# Patient Record
Sex: FEMALE
[De-identification: names, ages, dates, MRNs, and addresses within clinical notes are randomized per-mention and may not be internally consistent; named-entity substitution may affect disease eponyms.]

---

## 2017-02-21 ENCOUNTER — TRANSCRIPTION ENCOUNTER (OUTPATIENT)
Age: 22
End: 2017-02-21

## 2017-05-23 ENCOUNTER — APPOINTMENT (OUTPATIENT)
Dept: INTERNAL MEDICINE | Facility: CLINIC | Age: 22
End: 2017-05-23

## 2017-07-20 ENCOUNTER — EMERGENCY (EMERGENCY)
Facility: HOSPITAL | Age: 22
LOS: 1 days | Discharge: HOME | End: 2017-07-20
Attending: EMERGENCY MEDICINE
Payer: COMMERCIAL

## 2017-07-20 VITALS
OXYGEN SATURATION: 100 % | WEIGHT: 102.07 LBS | TEMPERATURE: 98 F | HEART RATE: 99 BPM | HEIGHT: 58 IN | SYSTOLIC BLOOD PRESSURE: 122 MMHG | DIASTOLIC BLOOD PRESSURE: 70 MMHG | RESPIRATION RATE: 18 BRPM

## 2017-07-20 PROCEDURE — 99283 EMERGENCY DEPT VISIT LOW MDM: CPT

## 2017-07-20 PROCEDURE — 99282 EMERGENCY DEPT VISIT SF MDM: CPT
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
## 2017-10-05 NOTE — ED STATDOCS - CARDIAC, MLM
skin suture normal rate, regular rhythm, and no murmur. normal rate, regular rhythm, and no murmur. No chest wall ttp

## 2018-04-17 ENCOUNTER — EMERGENCY (EMERGENCY)
Facility: HOSPITAL | Age: 23
LOS: 1 days | Discharge: HOME | End: 2018-04-17
Attending: EMERGENCY MEDICINE
Payer: COMMERCIAL

## 2018-04-17 VITALS
SYSTOLIC BLOOD PRESSURE: 118 MMHG | OXYGEN SATURATION: 100 % | HEART RATE: 83 BPM | DIASTOLIC BLOOD PRESSURE: 75 MMHG | TEMPERATURE: 98 F | RESPIRATION RATE: 18 BRPM

## 2018-04-17 VITALS — WEIGHT: 100.09 LBS | HEIGHT: 58 IN

## 2018-04-17 LAB
ALBUMIN SERPL ELPH-MCNC: 4.7 G/DL — SIGNIFICANT CHANGE UP (ref 3.3–5.2)
ALP SERPL-CCNC: 52 U/L — SIGNIFICANT CHANGE UP (ref 40–120)
ALT FLD-CCNC: 13 U/L — SIGNIFICANT CHANGE UP
ANION GAP SERPL CALC-SCNC: 15 MMOL/L — SIGNIFICANT CHANGE UP (ref 5–17)
APPEARANCE UR: CLEAR — SIGNIFICANT CHANGE UP
AST SERPL-CCNC: 22 U/L — SIGNIFICANT CHANGE UP
BACTERIA # UR AUTO: ABNORMAL
BASOPHILS # BLD AUTO: 0 K/UL — SIGNIFICANT CHANGE UP (ref 0–0.2)
BASOPHILS NFR BLD AUTO: 0.5 % — SIGNIFICANT CHANGE UP (ref 0–2)
BILIRUB SERPL-MCNC: <0.2 MG/DL — LOW (ref 0.4–2)
BILIRUB UR-MCNC: NEGATIVE — SIGNIFICANT CHANGE UP
BUN SERPL-MCNC: 6 MG/DL — LOW (ref 8–20)
CALCIUM SERPL-MCNC: 9.1 MG/DL — SIGNIFICANT CHANGE UP (ref 8.6–10.2)
CHLORIDE SERPL-SCNC: 98 MMOL/L — SIGNIFICANT CHANGE UP (ref 98–107)
CO2 SERPL-SCNC: 24 MMOL/L — SIGNIFICANT CHANGE UP (ref 22–29)
COLOR SPEC: YELLOW — SIGNIFICANT CHANGE UP
CREAT SERPL-MCNC: 0.56 MG/DL — SIGNIFICANT CHANGE UP (ref 0.5–1.3)
DIFF PNL FLD: ABNORMAL
EOSINOPHIL # BLD AUTO: 0 K/UL — SIGNIFICANT CHANGE UP (ref 0–0.5)
EOSINOPHIL NFR BLD AUTO: 0.5 % — SIGNIFICANT CHANGE UP (ref 0–6)
EPI CELLS # UR: SIGNIFICANT CHANGE UP
GLUCOSE SERPL-MCNC: 91 MG/DL — SIGNIFICANT CHANGE UP (ref 70–115)
GLUCOSE UR QL: NEGATIVE MG/DL — SIGNIFICANT CHANGE UP
HCG SERPL-ACNC: <5 MIU/ML — SIGNIFICANT CHANGE UP
HCG UR QL: NEGATIVE — SIGNIFICANT CHANGE UP
HCT VFR BLD CALC: 37.8 % — SIGNIFICANT CHANGE UP (ref 37–47)
HGB BLD-MCNC: 12.2 G/DL — SIGNIFICANT CHANGE UP (ref 12–16)
KETONES UR-MCNC: ABNORMAL
LEUKOCYTE ESTERASE UR-ACNC: NEGATIVE — SIGNIFICANT CHANGE UP
LIDOCAIN IGE QN: 32 U/L — SIGNIFICANT CHANGE UP (ref 22–51)
LYMPHOCYTES # BLD AUTO: 1.6 K/UL — SIGNIFICANT CHANGE UP (ref 1–4.8)
LYMPHOCYTES # BLD AUTO: 24.8 % — SIGNIFICANT CHANGE UP (ref 20–55)
MCHC RBC-ENTMCNC: 29.5 PG — SIGNIFICANT CHANGE UP (ref 27–31)
MCHC RBC-ENTMCNC: 32.3 G/DL — SIGNIFICANT CHANGE UP (ref 32–36)
MCV RBC AUTO: 91.3 FL — SIGNIFICANT CHANGE UP (ref 81–99)
MONOCYTES # BLD AUTO: 0.4 K/UL — SIGNIFICANT CHANGE UP (ref 0–0.8)
MONOCYTES NFR BLD AUTO: 6.3 % — SIGNIFICANT CHANGE UP (ref 3–10)
NEUTROPHILS # BLD AUTO: 4.4 K/UL — SIGNIFICANT CHANGE UP (ref 1.8–8)
NEUTROPHILS NFR BLD AUTO: 67.9 % — SIGNIFICANT CHANGE UP (ref 37–73)
NITRITE UR-MCNC: NEGATIVE — SIGNIFICANT CHANGE UP
PH UR: 6.5 — SIGNIFICANT CHANGE UP (ref 5–8)
PLATELET # BLD AUTO: 377 K/UL — SIGNIFICANT CHANGE UP (ref 150–400)
POTASSIUM SERPL-MCNC: 4 MMOL/L — SIGNIFICANT CHANGE UP (ref 3.5–5.3)
POTASSIUM SERPL-SCNC: 4 MMOL/L — SIGNIFICANT CHANGE UP (ref 3.5–5.3)
PROT SERPL-MCNC: 7.7 G/DL — SIGNIFICANT CHANGE UP (ref 6.6–8.7)
PROT UR-MCNC: NEGATIVE MG/DL — SIGNIFICANT CHANGE UP
RBC # BLD: 4.14 M/UL — LOW (ref 4.4–5.2)
RBC # FLD: 12.6 % — SIGNIFICANT CHANGE UP (ref 11–15.6)
RBC CASTS # UR COMP ASSIST: ABNORMAL /HPF (ref 0–4)
SODIUM SERPL-SCNC: 137 MMOL/L — SIGNIFICANT CHANGE UP (ref 135–145)
SP GR SPEC: 1.01 — SIGNIFICANT CHANGE UP (ref 1.01–1.02)
UROBILINOGEN FLD QL: NEGATIVE MG/DL — SIGNIFICANT CHANGE UP
WBC # BLD: 6.5 K/UL — SIGNIFICANT CHANGE UP (ref 4.8–10.8)
WBC # FLD AUTO: 6.5 K/UL — SIGNIFICANT CHANGE UP (ref 4.8–10.8)
WBC UR QL: SIGNIFICANT CHANGE UP

## 2018-04-17 PROCEDURE — 81001 URINALYSIS AUTO W/SCOPE: CPT

## 2018-04-17 PROCEDURE — 87206 SMEAR FLUORESCENT/ACID STAI: CPT

## 2018-04-17 PROCEDURE — 76856 US EXAM PELVIC COMPLETE: CPT

## 2018-04-17 PROCEDURE — 74177 CT ABD & PELVIS W/CONTRAST: CPT

## 2018-04-17 PROCEDURE — 81025 URINE PREGNANCY TEST: CPT

## 2018-04-17 PROCEDURE — 36415 COLL VENOUS BLD VENIPUNCTURE: CPT

## 2018-04-17 PROCEDURE — 87015 SPECIMEN INFECT AGNT CONCNTJ: CPT

## 2018-04-17 PROCEDURE — 99284 EMERGENCY DEPT VISIT MOD MDM: CPT

## 2018-04-17 PROCEDURE — 76830 TRANSVAGINAL US NON-OB: CPT

## 2018-04-17 PROCEDURE — 87070 CULTURE OTHR SPECIMN AEROBIC: CPT

## 2018-04-17 PROCEDURE — 85027 COMPLETE CBC AUTOMATED: CPT

## 2018-04-17 PROCEDURE — 83690 ASSAY OF LIPASE: CPT

## 2018-04-17 PROCEDURE — 76856 US EXAM PELVIC COMPLETE: CPT | Mod: 26

## 2018-04-17 PROCEDURE — 84702 CHORIONIC GONADOTROPIN TEST: CPT

## 2018-04-17 PROCEDURE — 74177 CT ABD & PELVIS W/CONTRAST: CPT | Mod: 26

## 2018-04-17 PROCEDURE — 76830 TRANSVAGINAL US NON-OB: CPT | Mod: 26

## 2018-04-17 PROCEDURE — 87116 MYCOBACTERIA CULTURE: CPT

## 2018-04-17 PROCEDURE — 80053 COMPREHEN METABOLIC PANEL: CPT

## 2018-04-17 RX ORDER — CEFTRIAXONE 500 MG/1
250 INJECTION, POWDER, FOR SOLUTION INTRAMUSCULAR; INTRAVENOUS ONCE
Qty: 0 | Refills: 0 | Status: COMPLETED | OUTPATIENT
Start: 2018-04-17 | End: 2018-04-17

## 2018-04-17 RX ORDER — AZITHROMYCIN 500 MG/1
1000 TABLET, FILM COATED ORAL ONCE
Qty: 0 | Refills: 0 | Status: COMPLETED | OUTPATIENT
Start: 2018-04-17 | End: 2018-04-17

## 2018-04-17 NOTE — ED PROVIDER NOTE - OBJECTIVE STATEMENT
23yoF with h/o asthma, presenting with generalized abdominal pain, generalized malaise, b/l back pain, chills.  Pt was at PMD Dr. Butterfield's office on Friday for dysuria - was started on Bactrim DS and feels dysuria has resolved but still not feeling well.  She also noted some increased vaginal discharge and reports recent unprotected intercourse w/ her partner.  LMP 3/27.  She reports nausea, but denies vomiting/diarrhea.

## 2018-04-17 NOTE — ED PROVIDER NOTE - PROGRESS NOTE DETAILS
I discussed results w/ patient and recommended treatment for PID until she is able to receive results from GC/ Chlamydia.  Pt states that now she is not sure if discharge is actually worse than usual and she wants to defer treatment until she gets results of the test. I instructed her to f/u with her PCP and with gynecology. Patient feeling well for discharge. She accepted 1 x treatment for STD treatment.  Repeat temp 98.8F

## 2018-04-17 NOTE — ED ADULT NURSE NOTE - CHPI ED SYMPTOMS NEG
no hematuria/no fever/no vomiting/no burning urination/no dysuria/no chills/no diarrhea/no nausea/no abdominal distension/no blood in stool

## 2018-04-17 NOTE — ED ADULT NURSE NOTE - OBJECTIVE STATEMENT
Patient states that she was seen in the office and treated for a UTI 5 days ago. Patient states that she has been on Bactrim for 5 days but feels like she is getting worse. Patient c.o lower abdominal pain and bloating. Patient denies any burning with urination or blood in her urine.

## 2018-04-17 NOTE — ED PROVIDER NOTE - MEDICAL DECISION MAKING DETAILS
Pt with generalzied malaise,  recent increased vaginal discharge; Pt with generalzied malaise, slight recent increased vaginal discharge; diffuse abdominal pain; and nausea; plan to check labs, perform pelvic exam, send GC/Ch, order pelvic US and consider CT if patient still not feeling well.

## 2018-04-17 NOTE — ED PROVIDER NOTE - TOBACCO USE
Electrophysiology Procedure Note: 


PROCEDURE PERFORMED:





1.   Upgrade of A-V PM to A-BiV PM


2.   Subclavian vein angiography


3.   Fluoroscopy





INDICATION:





Existing A-V PM


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 

cardiomyopathy


Atrial fibrillation, in sinus rhythm at time of procedure





PROCEDURE NOTE:





Patient presented to the cardiac catheterization laboratory in a fasting, 

postabsorptive state.  Dr. Flannery administered sedation.  The left 

infraclavicular area was prepped and draped in the usual sterile fashion.  

Lidocaine plus bupivacaine was used for local anesthesia.  Left subclavian 

venography was performed by injection of iodinated contrast into the left 

antecubital vein.  This was done to assure patency of the vein.  Using a 

combination of blunt and sharp dissection and electrocautery, the dissection 

was carried down to the prepectoral fascia.   The existing pm pocket was 

exposed.  All bleeding was controlled with electrocautery.  The pocket was 

packed with gauze soaked in antibiotic solution.  Fluoroscopy was utilized 

during the entire procedure for venous access and placement of the leads.





Using a direct stick technique the left extrathoracic axillary vein was 

accessed with 1 sticks using the modified Seldinger technique.  Placement of 

the guide wire into the venous system was confirmed by low pressure blood 

return and also by visualizing the guide wire advancing into the inferior vena 

cava.  A purse string suture was applied around the guide wire.  





Diagnostic testing of the existing leads was performed. Underlying rhythm sinus 

rhythm, first degree AV block, LBBB  





A 9 [place] Whorley sheath was advanced over the guide wire into the subclavian 

vein. The coronary sinus ostium was engaged.  Coronary sinus angiography was 

performed.  CS anatomy is aberrant  CS bifurcates with the superior branch 

continuing as AIV and giving off septal and anterolateral branch.  Inferior 

branch of the CS gives rise to lateral and posterior branches.   The superior 

branch of the CS was the diameter of the Whorley sheath. A coronary sinus 

Biotronik quadrapolar lead was advanced into the coronary sinus.  An 

angioplasty wire was advanced through the lead and advanced into the mid 

portion of the anterolateral branch of the coronary sinus.  The lead was 

advanced over the angioplasty wire.  Pacing threshold, sensing and impedance 

was determined.  There was no diaphragmatic stimulation at maximum output.  The 

delivery system and the 9 Fr sheath were peeled away. However lead dislodged.  





A second Whorley sheath was placed over the angioplasty wire and the process 

repeated, this time with a SJM quadrapolar lead.  An angioplasty wire was 

advanced through the lead and advanced into the mid portion of the 

anterolateral branch of the coronary sinus.  The lead was advanced over the 

angioplasty wire.  Pacing threshold, sensing and impedance was determined.  

There was no diaphragmatic stimulation at maximum output. Again, pacing 

threshold, sensing and impedance was determined.  There was no diaphragmatic 

stimulation at maximum output.  The CS lead was secured to the prepectoral 

fascia with 3nonabsorbablesutures.  





Pacing threshold and  sensing parameters of the RA and RV leads were checked 

again.  


 


The gauze packing was removed from the pacemaker pocket.  The pocket was again 

inspected for any bleeding.  The leads were attached to the pacemaker securely.

  The pacemaker was inserted into the pocket and secured in place with a 

nonabsorbable suture.  





Fluoroscopy was performed in [place] and [place] planes to verify right sided placement 

of the RA and RV leads.  Also fluoroscopy of the pacemaker pocket was performed.


 


The pacemaker pocket was closed in 3 layers with absorbable monocryl sutures 

and staples. Appropriate dressing was applied.  The patient left the cardiac 

catheterization laboratory in stable condition.


 


Serial Numbers:





1.   Device   Biotronik Edora 8HFT QP SN [id number]


2.   Atrial Lead Biotronik Solia S45 SN [id number]


3.   Right Ventricular Lead Biotronik Solia S53 SN [id number]


4.   Left Ventricular Lead SJM Quartet 1457Q SN [id number]





Stimulation Thresholds & Impedance Measurements:





1.   Atrial Lead         P 1.1 mV 1.2 V 0.4 ms 468 ohm


2.   Right Ventricular Lead   R 6.5mV 0.8 V 0.4 ms 624 ohm


3.   Left Ventricular Lead   R10.4 mV 0.9 V 0.4 ms 585 ohm





Pradeep Pacing Parameters:





1.   Pacing mode DDD CLS


2.   Lower rate 60 ppm


3.   Upper rate 130 ppm


4.   Mode switch rate 160 bpm, AV delay 150 ms








Patient Problems: 


 Problems











Problem Status Onset


 


Coronary artery bypass grafting Active  


 


Nausea and vomiting Active  


 


Non-healing surgical wound Active  


 


Hematuria syndrome Active  


 


Gastrointestinal bleeding, lower Acute  


 


Chronic Disease Management/Transitional Care Program Acute  


 


Acute combined systolic and diastolic CHF, NYHA class 3 Acute  


 


Coronary arteriosclerosis Acute  


 


S/P CABG x 3 Acute  


 


Chest pain Acute  


 


Elevated troponin Acute  


 


Elevated bilirubin Acute  


 


Subtherapeutic international normalized ratio (INR) Acute  


 


Altered mental status Acute Never smoker

## 2018-04-18 LAB
C TRACH RRNA SPEC QL NAA+PROBE: SIGNIFICANT CHANGE UP
C TRACH RRNA SPEC QL NAA+PROBE: SIGNIFICANT CHANGE UP
N GONORRHOEA RRNA SPEC QL NAA+PROBE: SIGNIFICANT CHANGE UP
N GONORRHOEA RRNA SPEC QL NAA+PROBE: SIGNIFICANT CHANGE UP
NIGHT BLUE STAIN TISS: SIGNIFICANT CHANGE UP
SPECIMEN SOURCE: SIGNIFICANT CHANGE UP

## 2018-04-19 LAB
CULTURE RESULTS: SIGNIFICANT CHANGE UP
SPECIMEN SOURCE: SIGNIFICANT CHANGE UP

## 2018-04-23 ENCOUNTER — TRANSCRIPTION ENCOUNTER (OUTPATIENT)
Age: 23
End: 2018-04-23

## 2018-06-06 LAB
CULTURE RESULTS: SIGNIFICANT CHANGE UP
SPECIMEN SOURCE: SIGNIFICANT CHANGE UP

## 2018-10-01 ENCOUNTER — OUTPATIENT (OUTPATIENT)
Dept: OUTPATIENT SERVICES | Facility: HOSPITAL | Age: 23
LOS: 1 days | End: 2018-10-01

## 2018-10-12 ENCOUNTER — EMERGENCY (EMERGENCY)
Facility: HOSPITAL | Age: 23
LOS: 1 days | Discharge: HOME | End: 2018-10-12
Attending: EMERGENCY MEDICINE
Payer: COMMERCIAL

## 2018-10-12 VITALS
TEMPERATURE: 99 F | RESPIRATION RATE: 18 BRPM | DIASTOLIC BLOOD PRESSURE: 89 MMHG | HEIGHT: 58 IN | SYSTOLIC BLOOD PRESSURE: 124 MMHG | HEART RATE: 93 BPM | WEIGHT: 106.04 LBS | OXYGEN SATURATION: 100 %

## 2018-10-12 LAB
ALBUMIN SERPL ELPH-MCNC: 4.8 G/DL — SIGNIFICANT CHANGE UP (ref 3.3–5.2)
ALP SERPL-CCNC: 55 U/L — SIGNIFICANT CHANGE UP (ref 40–120)
ALT FLD-CCNC: 18 U/L — SIGNIFICANT CHANGE UP
ANION GAP SERPL CALC-SCNC: 13 MMOL/L — SIGNIFICANT CHANGE UP (ref 5–17)
APPEARANCE UR: CLEAR — SIGNIFICANT CHANGE UP
AST SERPL-CCNC: 24 U/L — SIGNIFICANT CHANGE UP
BACTERIA # UR AUTO: ABNORMAL
BASOPHILS # BLD AUTO: 0 K/UL — SIGNIFICANT CHANGE UP (ref 0–0.2)
BASOPHILS NFR BLD AUTO: 0.3 % — SIGNIFICANT CHANGE UP (ref 0–2)
BILIRUB SERPL-MCNC: 0.2 MG/DL — LOW (ref 0.4–2)
BILIRUB UR-MCNC: NEGATIVE — SIGNIFICANT CHANGE UP
BLD GP AB SCN SERPL QL: SIGNIFICANT CHANGE UP
BUN SERPL-MCNC: 7 MG/DL — LOW (ref 8–20)
CALCIUM SERPL-MCNC: 9.4 MG/DL — SIGNIFICANT CHANGE UP (ref 8.6–10.2)
CHLORIDE SERPL-SCNC: 105 MMOL/L — SIGNIFICANT CHANGE UP (ref 98–107)
CO2 SERPL-SCNC: 25 MMOL/L — SIGNIFICANT CHANGE UP (ref 22–29)
COLOR SPEC: YELLOW — SIGNIFICANT CHANGE UP
CREAT SERPL-MCNC: 0.46 MG/DL — LOW (ref 0.5–1.3)
DIFF PNL FLD: ABNORMAL
EOSINOPHIL # BLD AUTO: 0 K/UL — SIGNIFICANT CHANGE UP (ref 0–0.5)
EOSINOPHIL NFR BLD AUTO: 0.3 % — SIGNIFICANT CHANGE UP (ref 0–6)
EPI CELLS # UR: ABNORMAL
GLUCOSE SERPL-MCNC: 145 MG/DL — HIGH (ref 70–115)
GLUCOSE UR QL: NEGATIVE MG/DL — SIGNIFICANT CHANGE UP
HCG SERPL-ACNC: 65.29 MIU/ML — SIGNIFICANT CHANGE UP
HCG UR QL: POSITIVE
HCT VFR BLD CALC: 38.9 % — SIGNIFICANT CHANGE UP (ref 37–47)
HGB BLD-MCNC: 12.1 G/DL — SIGNIFICANT CHANGE UP (ref 12–16)
KETONES UR-MCNC: ABNORMAL
LEUKOCYTE ESTERASE UR-ACNC: ABNORMAL
LYMPHOCYTES # BLD AUTO: 1.8 K/UL — SIGNIFICANT CHANGE UP (ref 1–4.8)
LYMPHOCYTES # BLD AUTO: 23.6 % — SIGNIFICANT CHANGE UP (ref 20–55)
MCHC RBC-ENTMCNC: 27.5 PG — SIGNIFICANT CHANGE UP (ref 27–31)
MCHC RBC-ENTMCNC: 31.1 G/DL — LOW (ref 32–36)
MCV RBC AUTO: 88.4 FL — SIGNIFICANT CHANGE UP (ref 81–99)
MONOCYTES # BLD AUTO: 0.5 K/UL — SIGNIFICANT CHANGE UP (ref 0–0.8)
MONOCYTES NFR BLD AUTO: 6.2 % — SIGNIFICANT CHANGE UP (ref 3–10)
NEUTROPHILS # BLD AUTO: 5.2 K/UL — SIGNIFICANT CHANGE UP (ref 1.8–8)
NEUTROPHILS NFR BLD AUTO: 69.6 % — SIGNIFICANT CHANGE UP (ref 37–73)
NITRITE UR-MCNC: NEGATIVE — SIGNIFICANT CHANGE UP
PH UR: 7 — SIGNIFICANT CHANGE UP (ref 5–8)
PLATELET # BLD AUTO: 381 K/UL — SIGNIFICANT CHANGE UP (ref 150–400)
POTASSIUM SERPL-MCNC: 4.1 MMOL/L — SIGNIFICANT CHANGE UP (ref 3.5–5.3)
POTASSIUM SERPL-SCNC: 4.1 MMOL/L — SIGNIFICANT CHANGE UP (ref 3.5–5.3)
PROT SERPL-MCNC: 7.7 G/DL — SIGNIFICANT CHANGE UP (ref 6.6–8.7)
PROT UR-MCNC: 15 MG/DL
RBC # BLD: 4.4 M/UL — SIGNIFICANT CHANGE UP (ref 4.4–5.2)
RBC # FLD: 15.9 % — HIGH (ref 11–15.6)
RBC CASTS # UR COMP ASSIST: SIGNIFICANT CHANGE UP /HPF (ref 0–4)
SODIUM SERPL-SCNC: 143 MMOL/L — SIGNIFICANT CHANGE UP (ref 135–145)
SP GR SPEC: 1.01 — SIGNIFICANT CHANGE UP (ref 1.01–1.02)
TYPE + AB SCN PNL BLD: SIGNIFICANT CHANGE UP
UROBILINOGEN FLD QL: NEGATIVE MG/DL — SIGNIFICANT CHANGE UP
WBC # BLD: 7.4 K/UL — SIGNIFICANT CHANGE UP (ref 4.8–10.8)
WBC # FLD AUTO: 7.4 K/UL — SIGNIFICANT CHANGE UP (ref 4.8–10.8)
WBC UR QL: SIGNIFICANT CHANGE UP

## 2018-10-12 PROCEDURE — 86850 RBC ANTIBODY SCREEN: CPT

## 2018-10-12 PROCEDURE — 86901 BLOOD TYPING SEROLOGIC RH(D): CPT

## 2018-10-12 PROCEDURE — 81025 URINE PREGNANCY TEST: CPT

## 2018-10-12 PROCEDURE — 86900 BLOOD TYPING SEROLOGIC ABO: CPT

## 2018-10-12 PROCEDURE — 85027 COMPLETE CBC AUTOMATED: CPT

## 2018-10-12 PROCEDURE — 36415 COLL VENOUS BLD VENIPUNCTURE: CPT

## 2018-10-12 PROCEDURE — 99284 EMERGENCY DEPT VISIT MOD MDM: CPT

## 2018-10-12 PROCEDURE — 76801 OB US < 14 WKS SINGLE FETUS: CPT

## 2018-10-12 PROCEDURE — 87086 URINE CULTURE/COLONY COUNT: CPT

## 2018-10-12 PROCEDURE — 80053 COMPREHEN METABOLIC PANEL: CPT

## 2018-10-12 PROCEDURE — 76801 OB US < 14 WKS SINGLE FETUS: CPT | Mod: 26

## 2018-10-12 PROCEDURE — 84702 CHORIONIC GONADOTROPIN TEST: CPT

## 2018-10-12 PROCEDURE — 81001 URINALYSIS AUTO W/SCOPE: CPT

## 2018-10-12 PROCEDURE — 76817 TRANSVAGINAL US OBSTETRIC: CPT | Mod: 26

## 2018-10-12 PROCEDURE — 76817 TRANSVAGINAL US OBSTETRIC: CPT

## 2018-10-12 NOTE — ED PROVIDER NOTE - ATTENDING CONTRIBUTION TO CARE
I, Efren Kinney, performed the initial face to face bedside interview with this patient regarding history of present illness, review of symptoms and relevant past medical, social and family history.  I completed an independent physical examination.  I was the initial provider who evaluated this patient. I have signed out the follow up of any pending tests (i.e. labs, radiological studies) to the ACP.  I have communicated the patient’s plan of care and disposition with the ACP.

## 2018-10-12 NOTE — ED PROVIDER NOTE - PROGRESS NOTE DETAILS
pt feeling ok now...states that she's been experiencing bilateral intermittent pelvic cramping over the past few days.  Discussed ultrasound results with patient and patient agrees to follow up in clinic in 2 days for repeat hcg/ultrasound.  Patient also understands that she should return to ED if she experiences bleeding or worsening pain.  Patient understands.

## 2018-10-12 NOTE — ED PROVIDER NOTE - MEDICAL DECISION MAKING DETAILS
suprapubic pain, known pregnancy by home pregnancy test, no prior pregancies  labs with HCG/US to eval pregancy:  early v. threatened v. ectopic; urine for UTI

## 2018-10-12 NOTE — ED STATDOCS - OBJECTIVE STATEMENT
Telemedicine assessment was conducted (using real time 2 way audio-video technology) by Dr. Lam Murphy located at [address] [place] [address]  ++++++++++++++++++++++++  Pertinent patient history and initial plan:  24 yo female, with lower abdominal pain and positive pregnancy test at home. +nausea. no vomiting or diarrhea  denies vaginal bleeding or discharge  labs, ua, sono..    Patient seen by me in intake for initial assessment and ordering. Physician on site to follow results and further evaluate and treat patient.

## 2018-10-12 NOTE — ED PROVIDER NOTE - OBJECTIVE STATEMENT
USOH until several days ago when abd pain = suprapubic cramping, intermittent, no radiation, no prior episodes, home pregnancy test positive x 2, slight nausea, no vomiting.  Urinating normally without pain, vaginal bleeding/discharge, diarrhea.  Never pregnancy before, sexually active, thought she was pregnant pre-testing. last menses on 9/3.  Tolerates po.    PMH = denies  PSH = denies  ALL = NKDA  MEDS = no prescribed medication  SH = no smoking/drinking  PMD = Dr. Byrd

## 2018-10-13 LAB
CULTURE RESULTS: SIGNIFICANT CHANGE UP
SPECIMEN SOURCE: SIGNIFICANT CHANGE UP

## 2018-10-14 ENCOUNTER — EMERGENCY (EMERGENCY)
Facility: HOSPITAL | Age: 23
LOS: 1 days | Discharge: HOME | End: 2018-10-14
Attending: EMERGENCY MEDICINE
Payer: COMMERCIAL

## 2018-10-14 VITALS
OXYGEN SATURATION: 98 % | TEMPERATURE: 99 F | DIASTOLIC BLOOD PRESSURE: 82 MMHG | SYSTOLIC BLOOD PRESSURE: 128 MMHG | RESPIRATION RATE: 18 BRPM | HEART RATE: 91 BPM

## 2018-10-14 VITALS — HEIGHT: 58 IN | WEIGHT: 106.04 LBS

## 2018-10-14 LAB — HCG SERPL-ACNC: 36.4 MIU/ML — SIGNIFICANT CHANGE UP

## 2018-10-14 PROCEDURE — 99283 EMERGENCY DEPT VISIT LOW MDM: CPT

## 2018-10-14 PROCEDURE — 36415 COLL VENOUS BLD VENIPUNCTURE: CPT

## 2018-10-14 PROCEDURE — 84702 CHORIONIC GONADOTROPIN TEST: CPT

## 2018-10-14 PROCEDURE — 99284 EMERGENCY DEPT VISIT MOD MDM: CPT

## 2018-10-14 NOTE — ED PROVIDER NOTE - CARE PLAN
Principal Discharge DX:	Missed   Assessment and plan of treatment:	outpatient obgyn follow up monday or tuesday

## 2018-10-14 NOTE — ED STATDOCS - OBJECTIVE STATEMENT
Telemedicine assessment was conducted (using real time 2 way audio-video technology) by Dr. MARIO Grove located at [address] [place] [id number]  ++++++++++++++++++++++++  Pertinent patient history and initial plan:  Pt here to have blood work ; She was told she was 5 weeks pregnant. LMP 9/3/18. Needs 48 hour HCG trend. Previous level of 65. Patient c/o nausea. Reports there is no abd pain at this time. As an aside, Also c/o throat pain. +sick contact in Mother.

## 2018-10-14 NOTE — ED ADULT TRIAGE NOTE - CHIEF COMPLAINT QUOTE
Pt states she Is aprox. 5 weeks pregnant, came to ED 2 days ago and was told to come to ED to repeat blood work.  Pt denies any vaginal bleeding.

## 2018-10-14 NOTE — ED PROVIDER NOTE - PHYSICAL EXAMINATION
PE: General: NAD, sitting up in bed.  Eyes: PERRLA, EOM intact. CV: RRR, no m/r/g. Lungs: CTA b/l, no use of accessory muscles, no wheezes, rales, rhonchi. Skin: warm, dry, no rashes. Psych: normal mood/affect. Abdomen: Normal BS, soft, NT/ND. Extremities: no edema, cyanosis. Musculoskeletal:  moving all extremities, no joint swelling. Neuro: A & O X 3, CN 2-12 grossly intact, no sensory or motor deficit.

## 2018-10-14 NOTE — ED PROVIDER NOTE - ATTENDING CONTRIBUTION TO CARE
I personally saw the patient with the PA, and completed the key components of the history and physical exam. I then discussed the management plan with the PA. In brief Hx (pt here for follow up HCG AFTER BEING SEEN HERE A FEW DAYS AGO AND WAS FOUND TO BE PREGNANT. LEVELS WERE LOW FOR HER DATES AND SHE WAS UNABLE TO SEE HER gyn. PT HAS NO PAIN OR BLEEDING NOW )Exam(nl ) Plan (HCG lower than before and discussed with GYN. Pt to maggi boyd in the clinic tommorrow )

## 2018-10-14 NOTE — ED PROVIDER NOTE - MEDICAL DECISION MAKING DETAILS
will obtain bHCG. Last known level was 65 on friday. Will correlate clinically. Otherwise no additional symptoms/complaints at this time.

## 2018-10-14 NOTE — ED PROVIDER NOTE - OBJECTIVE STATEMENT
23 y.o F with no PMH presents to ED for serial beta hcg. Pt was seen at [facility] friday to confirm at home + pregnancy test. At that visit urine and serum pregnancy test confirmed positive pregnancy, gestational age estimated to be 5 weeks given LMP, US completed which showed pregnancy of unknown location and pt was instructed to have a serial beta hcG completed in 48 hrs. Pt denies prior pregnancies, denies nausea/vomiting at this time, fever, chills, headache, dizziness, chest pain, SOB, abdominal pain, spotting/heavy bleeding. Remainder of ROS negative. VSS. 23 y.o F with no PMH presents to ED for serial beta hcg. Pt was seen at [facility] Friday to confirm at home + pregnancy test. At that visit urine and serum pregnancy test confirmed positive pregnancy, gestational age estimated to be 5 weeks given LMP, US completed which showed pregnancy of unknown location and pt was instructed to have a serial beta hcg completed in 48 hrs. Pt denies prior pregnancies, denies nausea/vomiting at this time, fever, chills, headache, dizziness, chest pain, SOB, abdominal pain, spotting/heavy bleeding, urinary complaints. Remainder of ROS negative. VSS. 23 y.o F with no PMH presents to ED for serial beta hcg. Pt was seen at [facility] Friday to confirm at home + pregnancy test. At that visit urine and serum pregnancy test confirmed positive pregnancy, gestational age estimated to be 5 weeks given LMP, US completed which showed pregnancy of unknown location and pt was instructed to have a serial beta hcg completed in 48 hrs. Pt denies prior pregnancies, denies nausea/vomiting at this time, fever, chills, headache, dizziness, chest pain, SOB, abdominal pain, spotting/heavy bleeding, urinary complaints. Remainder of ROS negative. VSS. LMP estimated around 9/3 23 y.o F with no PMH presents to ED for serial beta hcg. Pt was seen at [facility] Friday to confirm at home + pregnancy test. At that visit urine and serum hcg confirmed positive pregnancy, gestational age estimated to be 5 weeks given LMP (9/3), US completed which showed pregnancy of unknown location and pt was instructed to have a serial beta hcg completed in 48 hrs. Pt denies prior pregnancies, denies nausea/vomiting at this time, fever, chills, headache, dizziness, chest pain, SOB, abdominal pain, spotting/heavy bleeding, urinary complaints. Remainder of ROS negative. VSS. LMP estimated around 9/3

## 2018-10-14 NOTE — ED PROVIDER NOTE - PROGRESS NOTE DETAILS
hcg 36.  Called OB, awaiting call back Discussed with OB resident.. likely missed . Pt needs to follow up with OBGYN tomorrow or Tuesday in outpatient office for repeat HCG. HCG will need to be repeated until returns to 0. Pt was made aware. Pt educated that she may experiencing some spotting however if pt starts to experience bleeding worse than her usual period as well as worsening or severe abdominal pain pt educated to return to ED. Pt verbalizes understanding. Discussed with OB resident.. likely missed . Pt needs to follow up with OBGYN tomorrow or Tuesday in outpatient office for repeat HCG. HCG will need to be repeated until returns to 0. Pt was made aware. Pt educated that she may experience some spotting however if pt starts to experience bleeding worse than her usual period as well as worsening or severe abdominal pain pt educated to return to ED. Pt verbalizes understanding.

## 2018-10-14 NOTE — ED ADULT NURSE NOTE - NSIMPLEMENTINTERV_GEN_ALL_ED
Implemented All Universal Safety Interventions:  [place] to call system. Call bell, personal items and telephone within reach. Instruct patient to call for assistance. Room bathroom lighting operational. Non-slip footwear when patient is off stretcher. Physically safe environment: no spills, clutter or unnecessary equipment. Stretcher in lowest position, wheels locked, appropriate side rails in place.

## 2018-10-15 DIAGNOSIS — Z71.89 OTHER SPECIFIED COUNSELING: ICD-10-CM

## 2019-06-01 ENCOUNTER — INPATIENT (INPATIENT)
Facility: HOSPITAL | Age: 24
LOS: 0 days | Discharge: HOME | DRG: 690 | End: 2019-06-02
Attending: HOSPITALIST | Admitting: HOSPITALIST
Payer: COMMERCIAL

## 2019-06-01 VITALS
HEIGHT: 58 IN | OXYGEN SATURATION: 98 % | RESPIRATION RATE: 20 BRPM | HEART RATE: 118 BPM | TEMPERATURE: 98 F | DIASTOLIC BLOOD PRESSURE: 80 MMHG | SYSTOLIC BLOOD PRESSURE: 114 MMHG | WEIGHT: 110.01 LBS

## 2019-06-01 LAB
ALBUMIN SERPL ELPH-MCNC: 3.9 G/DL — SIGNIFICANT CHANGE UP (ref 3.3–5.2)
ALP SERPL-CCNC: 68 U/L — SIGNIFICANT CHANGE UP (ref 40–120)
ALT FLD-CCNC: 12 U/L — SIGNIFICANT CHANGE UP
ANION GAP SERPL CALC-SCNC: 16 MMOL/L — SIGNIFICANT CHANGE UP (ref 5–17)
APPEARANCE UR: CLEAR — SIGNIFICANT CHANGE UP
AST SERPL-CCNC: 17 U/L — SIGNIFICANT CHANGE UP
BACTERIA # UR AUTO: NEGATIVE — SIGNIFICANT CHANGE UP
BASOPHILS # BLD AUTO: 0 K/UL — SIGNIFICANT CHANGE UP (ref 0–0.2)
BASOPHILS NFR BLD AUTO: 0.1 % — SIGNIFICANT CHANGE UP (ref 0–2)
BILIRUB SERPL-MCNC: 0.4 MG/DL — SIGNIFICANT CHANGE UP (ref 0.4–2)
BILIRUB UR-MCNC: NEGATIVE — SIGNIFICANT CHANGE UP
BUN SERPL-MCNC: 10 MG/DL — SIGNIFICANT CHANGE UP (ref 8–20)
CALCIUM SERPL-MCNC: 8.5 MG/DL — LOW (ref 8.6–10.2)
CHLORIDE SERPL-SCNC: 98 MMOL/L — SIGNIFICANT CHANGE UP (ref 98–107)
CO2 SERPL-SCNC: 20 MMOL/L — LOW (ref 22–29)
COLOR SPEC: YELLOW — SIGNIFICANT CHANGE UP
CREAT SERPL-MCNC: 0.86 MG/DL — SIGNIFICANT CHANGE UP (ref 0.5–1.3)
DIFF PNL FLD: ABNORMAL
EOSINOPHIL # BLD AUTO: 0 K/UL — SIGNIFICANT CHANGE UP (ref 0–0.5)
EOSINOPHIL NFR BLD AUTO: 0 % — SIGNIFICANT CHANGE UP (ref 0–6)
EPI CELLS # UR: SIGNIFICANT CHANGE UP
GLUCOSE SERPL-MCNC: 123 MG/DL — HIGH (ref 70–115)
GLUCOSE UR QL: NEGATIVE MG/DL — SIGNIFICANT CHANGE UP
HCG UR QL: NEGATIVE — SIGNIFICANT CHANGE UP
HCT VFR BLD CALC: 35.6 % — LOW (ref 37–47)
HGB BLD-MCNC: 11.6 G/DL — LOW (ref 12–16)
KETONES UR-MCNC: ABNORMAL
LEUKOCYTE ESTERASE UR-ACNC: ABNORMAL
LIDOCAIN IGE QN: 16 U/L — LOW (ref 22–51)
LYMPHOCYTES # BLD AUTO: 1.5 K/UL — SIGNIFICANT CHANGE UP (ref 1–4.8)
LYMPHOCYTES # BLD AUTO: 8 % — LOW (ref 20–55)
MCHC RBC-ENTMCNC: 29.4 PG — SIGNIFICANT CHANGE UP (ref 27–31)
MCHC RBC-ENTMCNC: 32.6 G/DL — SIGNIFICANT CHANGE UP (ref 32–36)
MCV RBC AUTO: 90.1 FL — SIGNIFICANT CHANGE UP (ref 81–99)
MONOCYTES # BLD AUTO: 1.4 K/UL — HIGH (ref 0–0.8)
MONOCYTES NFR BLD AUTO: 7.7 % — SIGNIFICANT CHANGE UP (ref 3–10)
NEUTROPHILS # BLD AUTO: 15.6 K/UL — HIGH (ref 1.8–8)
NEUTROPHILS NFR BLD AUTO: 83.7 % — HIGH (ref 37–73)
NITRITE UR-MCNC: NEGATIVE — SIGNIFICANT CHANGE UP
PH UR: 5 — SIGNIFICANT CHANGE UP (ref 5–8)
PLATELET # BLD AUTO: 283 K/UL — SIGNIFICANT CHANGE UP (ref 150–400)
POTASSIUM SERPL-MCNC: 3.6 MMOL/L — SIGNIFICANT CHANGE UP (ref 3.5–5.3)
POTASSIUM SERPL-SCNC: 3.6 MMOL/L — SIGNIFICANT CHANGE UP (ref 3.5–5.3)
PROT SERPL-MCNC: 7.4 G/DL — SIGNIFICANT CHANGE UP (ref 6.6–8.7)
PROT UR-MCNC: 100 MG/DL
RBC # BLD: 3.95 M/UL — LOW (ref 4.4–5.2)
RBC # FLD: 13.9 % — SIGNIFICANT CHANGE UP (ref 11–15.6)
RBC CASTS # UR COMP ASSIST: SIGNIFICANT CHANGE UP /HPF (ref 0–4)
SODIUM SERPL-SCNC: 134 MMOL/L — LOW (ref 135–145)
SP GR SPEC: 1.01 — SIGNIFICANT CHANGE UP (ref 1.01–1.02)
UROBILINOGEN FLD QL: 1 MG/DL
WBC # BLD: 18.6 K/UL — HIGH (ref 4.8–10.8)
WBC # FLD AUTO: 18.6 K/UL — HIGH (ref 4.8–10.8)
WBC UR QL: SIGNIFICANT CHANGE UP

## 2019-06-01 PROCEDURE — 74177 CT ABD & PELVIS W/CONTRAST: CPT | Mod: 26

## 2019-06-01 PROCEDURE — 99218: CPT

## 2019-06-01 RX ORDER — ONDANSETRON 8 MG/1
1 TABLET, FILM COATED ORAL
Qty: 12 | Refills: 0
Start: 2019-06-01 | End: 2019-06-04

## 2019-06-01 RX ORDER — CEFTRIAXONE 500 MG/1
1 INJECTION, POWDER, FOR SOLUTION INTRAMUSCULAR; INTRAVENOUS ONCE
Refills: 0 | Status: COMPLETED | OUTPATIENT
Start: 2019-06-01 | End: 2019-06-01

## 2019-06-01 RX ORDER — SODIUM CHLORIDE 9 MG/ML
1000 INJECTION INTRAMUSCULAR; INTRAVENOUS; SUBCUTANEOUS
Refills: 0 | Status: DISCONTINUED | OUTPATIENT
Start: 2019-06-01 | End: 2019-06-02

## 2019-06-01 RX ORDER — METOCLOPRAMIDE HCL 10 MG
10 TABLET ORAL ONCE
Refills: 0 | Status: COMPLETED | OUTPATIENT
Start: 2019-06-01 | End: 2019-06-01

## 2019-06-01 RX ORDER — CEPHALEXIN 500 MG
1 CAPSULE ORAL
Qty: 40 | Refills: 0
Start: 2019-06-01 | End: 2019-06-10

## 2019-06-01 RX ORDER — ONDANSETRON 8 MG/1
4 TABLET, FILM COATED ORAL EVERY 8 HOURS
Refills: 0 | Status: DISCONTINUED | OUTPATIENT
Start: 2019-06-01 | End: 2019-06-02

## 2019-06-01 RX ORDER — CEFTRIAXONE 500 MG/1
INJECTION, POWDER, FOR SOLUTION INTRAMUSCULAR; INTRAVENOUS
Refills: 0 | Status: DISCONTINUED | OUTPATIENT
Start: 2019-06-01 | End: 2019-06-01

## 2019-06-01 RX ORDER — ACETAMINOPHEN 500 MG
975 TABLET ORAL EVERY 6 HOURS
Refills: 0 | Status: DISCONTINUED | OUTPATIENT
Start: 2019-06-01 | End: 2019-06-02

## 2019-06-01 RX ORDER — ONDANSETRON 8 MG/1
4 TABLET, FILM COATED ORAL ONCE
Refills: 0 | Status: COMPLETED | OUTPATIENT
Start: 2019-06-01 | End: 2019-06-01

## 2019-06-01 RX ORDER — KETOROLAC TROMETHAMINE 30 MG/ML
15 SYRINGE (ML) INJECTION ONCE
Refills: 0 | Status: DISCONTINUED | OUTPATIENT
Start: 2019-06-01 | End: 2019-06-01

## 2019-06-01 RX ORDER — MORPHINE SULFATE 50 MG/1
4 CAPSULE, EXTENDED RELEASE ORAL ONCE
Refills: 0 | Status: DISCONTINUED | OUTPATIENT
Start: 2019-06-01 | End: 2019-06-01

## 2019-06-01 RX ORDER — IBUPROFEN 200 MG
600 TABLET ORAL EVERY 8 HOURS
Refills: 0 | Status: DISCONTINUED | OUTPATIENT
Start: 2019-06-01 | End: 2019-06-02

## 2019-06-01 RX ORDER — IBUPROFEN 200 MG
400 TABLET ORAL ONCE
Refills: 0 | Status: COMPLETED | OUTPATIENT
Start: 2019-06-01 | End: 2019-06-01

## 2019-06-01 RX ORDER — SODIUM CHLORIDE 9 MG/ML
1000 INJECTION INTRAMUSCULAR; INTRAVENOUS; SUBCUTANEOUS ONCE
Refills: 0 | Status: COMPLETED | OUTPATIENT
Start: 2019-06-01 | End: 2019-06-01

## 2019-06-01 RX ADMIN — Medication 400 MILLIGRAM(S): at 14:20

## 2019-06-01 RX ADMIN — Medication 975 MILLIGRAM(S): at 19:10

## 2019-06-01 RX ADMIN — CEFTRIAXONE 1 GRAM(S): 500 INJECTION, POWDER, FOR SOLUTION INTRAMUSCULAR; INTRAVENOUS at 14:50

## 2019-06-01 RX ADMIN — Medication 400 MILLIGRAM(S): at 13:22

## 2019-06-01 RX ADMIN — MORPHINE SULFATE 4 MILLIGRAM(S): 50 CAPSULE, EXTENDED RELEASE ORAL at 14:13

## 2019-06-01 RX ADMIN — CEFTRIAXONE 100 GRAM(S): 500 INJECTION, POWDER, FOR SOLUTION INTRAMUSCULAR; INTRAVENOUS at 20:12

## 2019-06-01 RX ADMIN — SODIUM CHLORIDE 1000 MILLILITER(S): 9 INJECTION INTRAMUSCULAR; INTRAVENOUS; SUBCUTANEOUS at 11:53

## 2019-06-01 RX ADMIN — SODIUM CHLORIDE 1000 MILLILITER(S): 9 INJECTION INTRAMUSCULAR; INTRAVENOUS; SUBCUTANEOUS at 12:53

## 2019-06-01 RX ADMIN — Medication 15 MILLIGRAM(S): at 18:13

## 2019-06-01 RX ADMIN — MORPHINE SULFATE 4 MILLIGRAM(S): 50 CAPSULE, EXTENDED RELEASE ORAL at 13:45

## 2019-06-01 RX ADMIN — SODIUM CHLORIDE 1000 MILLILITER(S): 9 INJECTION INTRAMUSCULAR; INTRAVENOUS; SUBCUTANEOUS at 21:27

## 2019-06-01 RX ADMIN — SODIUM CHLORIDE 1000 MILLILITER(S): 9 INJECTION INTRAMUSCULAR; INTRAVENOUS; SUBCUTANEOUS at 14:26

## 2019-06-01 RX ADMIN — Medication 15 MILLIGRAM(S): at 18:36

## 2019-06-01 RX ADMIN — SODIUM CHLORIDE 1000 MILLILITER(S): 9 INJECTION INTRAMUSCULAR; INTRAVENOUS; SUBCUTANEOUS at 20:24

## 2019-06-01 RX ADMIN — SODIUM CHLORIDE 250 MILLILITER(S): 9 INJECTION INTRAMUSCULAR; INTRAVENOUS; SUBCUTANEOUS at 22:18

## 2019-06-01 RX ADMIN — Medication 10 MILLIGRAM(S): at 14:17

## 2019-06-01 RX ADMIN — CEFTRIAXONE 1 GRAM(S): 500 INJECTION, POWDER, FOR SOLUTION INTRAMUSCULAR; INTRAVENOUS at 20:42

## 2019-06-01 RX ADMIN — SODIUM CHLORIDE 1000 MILLILITER(S): 9 INJECTION INTRAMUSCULAR; INTRAVENOUS; SUBCUTANEOUS at 22:18

## 2019-06-01 RX ADMIN — ONDANSETRON 4 MILLIGRAM(S): 8 TABLET, FILM COATED ORAL at 11:52

## 2019-06-01 RX ADMIN — SODIUM CHLORIDE 1000 MILLILITER(S): 9 INJECTION INTRAMUSCULAR; INTRAVENOUS; SUBCUTANEOUS at 13:22

## 2019-06-01 RX ADMIN — SODIUM CHLORIDE 250 MILLILITER(S): 9 INJECTION INTRAMUSCULAR; INTRAVENOUS; SUBCUTANEOUS at 18:15

## 2019-06-01 RX ADMIN — ONDANSETRON 4 MILLIGRAM(S): 8 TABLET, FILM COATED ORAL at 13:26

## 2019-06-01 RX ADMIN — CEFTRIAXONE 100 GRAM(S): 500 INJECTION, POWDER, FOR SOLUTION INTRAMUSCULAR; INTRAVENOUS at 14:17

## 2019-06-01 NOTE — ED CDU PROVIDER INITIAL DAY NOTE - OBJECTIVE STATEMENT
23 y/o F c/o abdominal pain and vomiting x 1 day.  Patient also c/o subjective fever and chills.  Denies chest pain, headache or difficulty breathing.

## 2019-06-01 NOTE — ED STATDOCS - CLINICAL SUMMARY MEDICAL DECISION MAKING FREE TEXT BOX
23 y/o F c/o left sided flank pain nausea and vomiting x several days. Not tolerating abx or PO. Will  hydrate, check, check labs consider dose of Rocephin in ER and reassess.

## 2019-06-01 NOTE — ED CDU PROVIDER INITIAL DAY NOTE - PROGRESS NOTE DETAILS
Pt received from DARA Drummond, pt resting comfortably and currently tolerating PO. Discussed VS with Dr. Berrios, will order tylenol and fluid bolus, repeat VS.

## 2019-06-01 NOTE — ED ADULT NURSE NOTE - NSIMPLEMENTINTERV_GEN_ALL_ED
Implemented All Universal Safety Interventions:  [place] to call system. Call bell, personal items and telephone within reach. Instruct patient to call for assistance. Room bathroom lighting operational. Non-slip footwear when patient is off stretcher. Physically safe environment: no spills, clutter or unnecessary equipment. Stretcher in lowest position, wheels locked, appropriate side rails in place.

## 2019-06-01 NOTE — ED STATDOCS - ATTENDING CONTRIBUTION TO CARE
I, Beryl Dickerson, performed the initial face to face bedside interview with this patient regarding history of present illness, review of symptoms and relevant past medical, social and family history.  I completed an independent physical examination.  I was the initial provider who evaluated this patient. I have signed out the follow up of any pending tests (i.e. labs, radiological studies) to the ACP.  I have communicated the patient’s plan of care and disposition with the ACP.  The history, relevant review of systems, past medical and surgical history, medical decision making, and physical examination was documented by the scribe in my presence and I attest to the accuracy of the documentation.

## 2019-06-01 NOTE — ED CDU PROVIDER INITIAL DAY NOTE - PHYSICAL EXAMINATION
General: no fever, A&O x 3  HEENT: airway patent  Respiratory: lungs CTA bilaterally  Cardiac: S1S2, regular rate and rhythm  Abdomen: abdomen non-tender  Musculoskeletal: no midline back tenderness, no C-spine tenderness, full ROM, no deformity  Neuro: 5/5 motor strength in all extremities, CN II-XII intact  Skin: no lesions or rashes

## 2019-06-01 NOTE — ED CDU PROVIDER INITIAL DAY NOTE - NS ED ROS FT
General: no fever/chills  HEENT: no difficulty swallowing  Respiratory: no shortness of breath or cough  Cardiac: no chest pain or palpitations  Abdomen: + abdominal pain and vomiting  Musculoskeletal: no neck pain or back pain  Neuro: no LOC, no seizures, no weaknesses  Skin: no lesions or rashes

## 2019-06-01 NOTE — ED STATDOCS - OBJECTIVE STATEMENT
25 y/o F pt presents to ED with mother at bedside c/o vomiting, fever and not able to tolerate PO x 3 days. Presented to PMD; diagnosed with UTI/possible kidney infection and started on Cipro; patient called later requesting to change Cipro to Macrobid due to prior reaction with Bactrim. However, has not been able to take the Macrobid secondary to vomiting. Additionally reports increased frequency and left flank pain x 5 days. Is due her menses next week. No further complaints at this time.

## 2019-06-01 NOTE — ED CDU PROVIDER INITIAL DAY NOTE - ATTENDING CONTRIBUTION TO CARE
I, Kelly Garner, performed the initial face to face bedside interview with this patient regarding history of present illness, review of symptoms and relevant past medical, social and family history.  I completed an independent physical examination.  I was the initial provider who evaluated this patient. I have signed out the follow up of any pending tests (i.e. labs, radiological studies) to the ACP.  I have communicated the patient’s plan of care and disposition with the ACP.

## 2019-06-01 NOTE — ED STATDOCS - PROGRESS NOTE DETAILS
Pt seen by intake MD and agree with HPI/ROS/PE/Plan. Pt pending labs. Will reassess. Pt with increased pain on L side and vomiting. CT ordered awaiting results. Pt has pyelo. Will keep in obs for IV abx.

## 2019-06-02 ENCOUNTER — TRANSCRIPTION ENCOUNTER (OUTPATIENT)
Age: 24
End: 2019-06-02

## 2019-06-02 VITALS
DIASTOLIC BLOOD PRESSURE: 79 MMHG | SYSTOLIC BLOOD PRESSURE: 114 MMHG | OXYGEN SATURATION: 99 % | TEMPERATURE: 98 F | RESPIRATION RATE: 16 BRPM | HEART RATE: 92 BPM

## 2019-06-02 DIAGNOSIS — N12 TUBULO-INTERSTITIAL NEPHRITIS, NOT SPECIFIED AS ACUTE OR CHRONIC: ICD-10-CM

## 2019-06-02 LAB
CULTURE RESULTS: SIGNIFICANT CHANGE UP
LACTATE BLDV-MCNC: 0.8 MMOL/L — SIGNIFICANT CHANGE UP (ref 0.5–2)
SPECIMEN SOURCE: SIGNIFICANT CHANGE UP

## 2019-06-02 PROCEDURE — 87086 URINE CULTURE/COLONY COUNT: CPT

## 2019-06-02 PROCEDURE — 96366 THER/PROPH/DIAG IV INF ADDON: CPT

## 2019-06-02 PROCEDURE — 83690 ASSAY OF LIPASE: CPT

## 2019-06-02 PROCEDURE — 87040 BLOOD CULTURE FOR BACTERIA: CPT

## 2019-06-02 PROCEDURE — 81025 URINE PREGNANCY TEST: CPT

## 2019-06-02 PROCEDURE — 85027 COMPLETE CBC AUTOMATED: CPT

## 2019-06-02 PROCEDURE — 83605 ASSAY OF LACTIC ACID: CPT

## 2019-06-02 PROCEDURE — 99284 EMERGENCY DEPT VISIT MOD MDM: CPT | Mod: 25

## 2019-06-02 PROCEDURE — G0378: CPT

## 2019-06-02 PROCEDURE — 36415 COLL VENOUS BLD VENIPUNCTURE: CPT

## 2019-06-02 PROCEDURE — 99217: CPT

## 2019-06-02 PROCEDURE — 81001 URINALYSIS AUTO W/SCOPE: CPT

## 2019-06-02 PROCEDURE — 80053 COMPREHEN METABOLIC PANEL: CPT

## 2019-06-02 PROCEDURE — 96375 TX/PRO/DX INJ NEW DRUG ADDON: CPT

## 2019-06-02 PROCEDURE — 96361 HYDRATE IV INFUSION ADD-ON: CPT | Mod: XU

## 2019-06-02 PROCEDURE — 74177 CT ABD & PELVIS W/CONTRAST: CPT

## 2019-06-02 PROCEDURE — 96365 THER/PROPH/DIAG IV INF INIT: CPT | Mod: XU

## 2019-06-02 PROCEDURE — 96376 TX/PRO/DX INJ SAME DRUG ADON: CPT

## 2019-06-02 RX ORDER — METOCLOPRAMIDE HCL 10 MG
10 TABLET ORAL ONCE
Refills: 0 | Status: DISCONTINUED | OUTPATIENT
Start: 2019-06-02 | End: 2019-06-02

## 2019-06-02 RX ORDER — IBUPROFEN 200 MG
1 TABLET ORAL
Qty: 20 | Refills: 0
Start: 2019-06-02 | End: 2019-06-06

## 2019-06-02 RX ORDER — SODIUM CHLORIDE 9 MG/ML
1000 INJECTION, SOLUTION INTRAVENOUS
Refills: 0 | Status: DISCONTINUED | OUTPATIENT
Start: 2019-06-02 | End: 2019-06-02

## 2019-06-02 RX ORDER — ONDANSETRON 8 MG/1
1 TABLET, FILM COATED ORAL
Qty: 15 | Refills: 0
Start: 2019-06-02 | End: 2019-06-06

## 2019-06-02 RX ORDER — CEFPODOXIME PROXETIL 100 MG
1 TABLET ORAL
Qty: 20 | Refills: 0
Start: 2019-06-02 | End: 2019-06-11

## 2019-06-02 RX ADMIN — ONDANSETRON 4 MILLIGRAM(S): 8 TABLET, FILM COATED ORAL at 07:45

## 2019-06-02 RX ADMIN — SODIUM CHLORIDE 1000 MILLILITER(S): 9 INJECTION INTRAMUSCULAR; INTRAVENOUS; SUBCUTANEOUS at 03:28

## 2019-06-02 RX ADMIN — SODIUM CHLORIDE 250 MILLILITER(S): 9 INJECTION INTRAMUSCULAR; INTRAVENOUS; SUBCUTANEOUS at 03:28

## 2019-06-02 RX ADMIN — Medication 975 MILLIGRAM(S): at 06:11

## 2019-06-02 RX ADMIN — Medication 600 MILLIGRAM(S): at 00:12

## 2019-06-02 RX ADMIN — SODIUM CHLORIDE 1000 MILLILITER(S): 9 INJECTION INTRAMUSCULAR; INTRAVENOUS; SUBCUTANEOUS at 09:45

## 2019-06-02 RX ADMIN — ONDANSETRON 4 MILLIGRAM(S): 8 TABLET, FILM COATED ORAL at 00:13

## 2019-06-02 RX ADMIN — Medication 600 MILLIGRAM(S): at 01:12

## 2019-06-02 RX ADMIN — SODIUM CHLORIDE 125 MILLILITER(S): 9 INJECTION, SOLUTION INTRAVENOUS at 09:46

## 2019-06-02 NOTE — ED ADULT NURSE REASSESSMENT NOTE - NS ED NURSE REASSESS COMMENT FT1
MD Ankit Berrios and Zenobia Rodriguez at bedside to reevaluate patient and review plan of care.
Pt alert and oriented. resting in stretcher, no signs of distress noted. Handoff report given to Geena Townsend RN and pt's safety maintained. RN with no questions or concerns at this time
Pt dry heaving with 100.0 PO temp c/o 10/10 left flank pain.  Enma DIAMOND made aware. Pt medicated with more zofran and PO motrin as per her order.
pt was able to tolerate half of her dinner entree without any nausea
verbal report rec'd from ED RN bryan Lowery to be moved to CDU for observation
pt resting in stretcher, with no c/o pain or discomfort. VSS and documented as per flow sheet. no apparent distress noted. bed in lowest position, call bell within reach and safety maintained.
Assumed  pt care from previous Rn Arianna Maldonado @1925. pt  A &Ox3, with no complaints of N/V, or abdominal pain. resting in stretcher. IVF infusing as ordered. pending repeat VS/ IV abx-2000. plan of care reviewed with pt. verbalizes positive understanding. bed in lowest position,call bell within reach and safety maintained.
Assumed care of the patient at 0730. Patient A&Ox3. no s/s of distress or pain. states abdominal pain improved. VSS, afebrile. IVF infusing as per orders. Patient c/o mild nausea, Zofran IV given as per orders. Patient in understanding of plan of care. Patient with no further questions for the nurse. Will continue to monitor.

## 2019-06-02 NOTE — ED ADULT NURSE REASSESSMENT NOTE - GENITOURINARY WDL
Bladder non-tender and non-distended. Urine clear yellow.
Bladder non-tender and non-distended.
Bladder non-tender and non-distended.

## 2019-06-02 NOTE — ED CDU PROVIDER SUBSEQUENT DAY NOTE - PHYSICAL EXAMINATION
Vital signs noted, see flowsheet.  General: Well nourished/developed. In no acute distress, well appearing and non-toxic.  HEENT: Normocephalic/atraumatic.  Moist mucous membranes. PERRL.  Neck: Soft and supple, full ROM without pain.  Cardiac: Regular rate and rhythm. +S1/S2. Peripheral pulses 2+ and symmetric b/l.   Respiratory: Speaking in full sentences, no evidence of respiratory distress. Lungs clear to ascultation b/l, no wheezes/rhonchi/rales/stridor.   Abdomen: Normoactive bowel sounds x 4 quadrants. Soft, non-tender, non-distended. No guarding or rebound tenderness. No suprapubic tenderness.  Back: Spine midline and non-tender. MILD LEFT CVAT  Skin: Normal color for race, no evidence of rash, ecchymosis, cyanosis or jaundice.   Lymph: No cervical lymphadenopathy  Neuro: Awake, alert and oriented to person/place/time/situation. Moves all extremities spontaneously and symmetrically.

## 2019-06-02 NOTE — ED CDU PROVIDER DISPOSITION NOTE - CLINICAL COURSE
24 year old female with L sided pyelo, receiving ceftriaxone, overnight spiking fevers and hypotensive, will admit for IV abx, pending blood cultures 24 year old female with L sided pyelo, receiving ceftriaxone, overnight spiking fevers and hypotensive, tolerating PO, will give PO Vantin, anti-emetics and IBU and have f/u with urology

## 2019-06-02 NOTE — ED CDU PROVIDER SUBSEQUENT DAY NOTE - HISTORY
Pt improved, tolerating minimal PO, still feeling nauseated, improving VS, will continue to monitor, continue with tylenol/motrin, zofran and repeat VS.

## 2019-06-02 NOTE — ED CDU PROVIDER DISPOSITION NOTE - CARE PROVIDER_API CALL
Nikita Rico)  Urology  [address] [facility], [address]  [place] [address]  Phone: [phone]  Fax: [phone]  Follow Up Time:

## 2019-06-02 NOTE — ED CDU PROVIDER SUBSEQUENT DAY NOTE - ATTENDING CONTRIBUTION TO CARE
pt reevaluated for discharge ;  however, pt still feels nauseous , but tolerating liquids; Will continue observation until the morning

## 2019-06-02 NOTE — ED ADULT NURSE REASSESSMENT NOTE - COMFORT CARE
meal provided
warm blanket provided
plan of care explained
ambulated to bathroom
plan of care explained
po fluids offered/wait time explained/plan of care explained

## 2019-06-02 NOTE — ED ADULT NURSE REASSESSMENT NOTE - GENERAL PATIENT STATE
smiling/interactive/comfortable appearance/resting/sleeping
comfortable appearance
comfortable appearance/cooperative/smiling/interactive/resting/sleeping
cooperative/resting/sleeping/comfortable appearance/improvement verbalized/family/SO at bedside/smiling/interactive

## 2019-06-04 ENCOUNTER — INBOUND DOCUMENT (OUTPATIENT)
Age: 24
End: 2019-06-04

## 2019-06-06 LAB
CULTURE RESULTS: SIGNIFICANT CHANGE UP
CULTURE RESULTS: SIGNIFICANT CHANGE UP
SPECIMEN SOURCE: SIGNIFICANT CHANGE UP
SPECIMEN SOURCE: SIGNIFICANT CHANGE UP

## 2019-06-13 ENCOUNTER — APPOINTMENT (OUTPATIENT)
Dept: UROLOGY | Facility: CLINIC | Age: 24
End: 2019-06-13
Payer: COMMERCIAL

## 2019-06-13 VITALS
HEIGHT: 58 IN | DIASTOLIC BLOOD PRESSURE: 76 MMHG | WEIGHT: 110 LBS | HEART RATE: 103 BPM | SYSTOLIC BLOOD PRESSURE: 110 MMHG | BODY MASS INDEX: 23.09 KG/M2

## 2019-06-13 DIAGNOSIS — Z82.49 FAMILY HISTORY OF ISCHEMIC HEART DISEASE AND OTHER DISEASES OF THE CIRCULATORY SYSTEM: ICD-10-CM

## 2019-06-13 DIAGNOSIS — Z87.09 PERSONAL HISTORY OF OTHER DISEASES OF THE RESPIRATORY SYSTEM: ICD-10-CM

## 2019-06-13 DIAGNOSIS — Z83.3 FAMILY HISTORY OF DIABETES MELLITUS: ICD-10-CM

## 2019-06-13 DIAGNOSIS — N12 TUBULO-INTERSTITIAL NEPHRITIS, NOT SPECIFIED AS ACUTE OR CHRONIC: ICD-10-CM

## 2019-06-13 DIAGNOSIS — N39.0 URINARY TRACT INFECTION, SITE NOT SPECIFIED: ICD-10-CM

## 2019-06-13 LAB
BILIRUB UR QL STRIP: NORMAL
CLARITY UR: CLEAR
COLLECTION METHOD: NORMAL
GLUCOSE UR-MCNC: NORMAL
HCG UR QL: 0.2 EU/DL
HGB UR QL STRIP.AUTO: NORMAL
KETONES UR-MCNC: NORMAL
LEUKOCYTE ESTERASE UR QL STRIP: NORMAL
NITRITE UR QL STRIP: NORMAL
PH UR STRIP: 7
PROT UR STRIP-MCNC: NORMAL
SP GR UR STRIP: 1.02

## 2019-06-13 PROCEDURE — 81003 URINALYSIS AUTO W/O SCOPE: CPT | Mod: QW

## 2019-06-13 PROCEDURE — 99203 OFFICE O/P NEW LOW 30 MIN: CPT | Mod: 25

## 2019-06-13 NOTE — PHYSICAL EXAM
[General Appearance - Well Developed] : well developed [General Appearance - Well Nourished] : well nourished [Normal Appearance] : normal appearance [Well Groomed] : well groomed [General Appearance - In No Acute Distress] : no acute distress [Edema] : no peripheral edema [Respiration, Rhythm And Depth] : normal respiratory rhythm and effort [Exaggerated Use Of Accessory Muscles For Inspiration] : no accessory muscle use [Normal Station and Gait] : the gait and station were normal for the patient's age [] : no rash [Oriented To Time, Place, And Person] : oriented to person, place, and time [No Focal Deficits] : no focal deficits [Affect] : the affect was normal [Not Anxious] : not anxious [Mood] : the mood was normal [FreeTextEntry1] : mild left CVA tenbderness

## 2019-06-13 NOTE — REVIEW OF SYSTEMS
[Fever] : fever [Chills] : chills [Feeling Tired] : feeling tired [Shortness Of Breath] : shortness of breath [Cough] : cough [Vomiting] : vomiting [Diarrhea] : diarrhea [Date of last menstrual period ____] : date of last menstrual period: [unfilled] [see HPI] : see HPI [Urine Infection (bladder/kidney)] : bladder/kidney infection [Dizziness] : dizziness [Negative] : Heme/Lymph

## 2019-06-13 NOTE — HISTORY OF PRESENT ILLNESS
[FreeTextEntry1] : Patient was seen at her primary and was diagnosed with kidney infection.  Had to be hospitalized.  Was vomiting.  Had left flank pain. She had a CT scanl. Was noted to have microscopic hematuria.  Has noted UTi in the past.  She visited her primary due to fever and back pain.  She felt malaise.  CT showed pyelonephritis and she may relate previous infections to intercourse. \par

## 2019-06-13 NOTE — ASSESSMENT
[FreeTextEntry1] : Impression:\par \par resolved pyelonephritis.\par \par Plan:\par \par prophylactic nitrofurantoin\par follow up in 6 weeks.

## 2019-06-13 NOTE — LETTER BODY
[Dear  ___] : Dear  [unfilled], [Courtesy Letter:] : I had the pleasure of seeing your patient, [unfilled], in my office today. [Please see my note below.] : Please see my note below. [Sincerely,] : Sincerely, [FreeTextEntry3] : Ed\par \par Nikita Rico MD\par [facility]\par  of Urology\par [employer] [facility]\par

## 2019-07-04 ENCOUNTER — MESSAGE (OUTPATIENT)
Age: 24
End: 2019-07-04

## 2019-07-25 ENCOUNTER — APPOINTMENT (OUTPATIENT)
Dept: UROLOGY | Facility: CLINIC | Age: 24
End: 2019-07-25

## 2019-12-26 NOTE — ED ADULT NURSE NOTE - PATIENT DISCHARGE SIGNATURE
Contacted patient regarding allergy clarification interview. Left a HIPAA secured voicemail.    
17-Apr-2018

## 2020-07-13 ENCOUNTER — APPOINTMENT (OUTPATIENT)
Dept: UROLOGY | Facility: CLINIC | Age: 25
End: 2020-07-13
Payer: MEDICAID

## 2020-07-13 VITALS
HEIGHT: 59 IN | HEART RATE: 123 BPM | TEMPERATURE: 99.2 F | DIASTOLIC BLOOD PRESSURE: 75 MMHG | WEIGHT: 120 LBS | RESPIRATION RATE: 17 BRPM | BODY MASS INDEX: 24.19 KG/M2 | SYSTOLIC BLOOD PRESSURE: 119 MMHG

## 2020-07-13 DIAGNOSIS — R30.0 DYSURIA: ICD-10-CM

## 2020-07-13 DIAGNOSIS — R31.29 OTHER MICROSCOPIC HEMATURIA: ICD-10-CM

## 2020-07-13 DIAGNOSIS — R35.0 FREQUENCY OF MICTURITION: ICD-10-CM

## 2020-07-13 PROCEDURE — 99214 OFFICE O/P EST MOD 30 MIN: CPT

## 2020-07-13 RX ORDER — NITROFURANTOIN MACROCRYSTALS 100 MG/1
100 CAPSULE ORAL
Qty: 30 | Refills: 3 | Status: DISCONTINUED | COMMUNITY
Start: 2019-06-13 | End: 2020-07-13

## 2020-07-13 RX ORDER — NITROFURANTOIN MACROCRYSTALS 100 MG/1
100 CAPSULE ORAL
Qty: 30 | Refills: 3 | Status: DISCONTINUED | COMMUNITY
Start: 2019-07-04 | End: 2020-07-13

## 2020-07-13 NOTE — HISTORY OF PRESENT ILLNESS
[Urinary Urgency] : urinary urgency [Urinary Frequency] : urinary frequency [Weak Stream] : weak stream [Flank Pain] : flank pain [Left Flank] : left flank [FreeTextEntry1] : C/o left flank pain x couple days, frequency, incomplete bladder emptying, urgency, awakens 2-3x/night and sometimes slow stream. Symptoms similar to Pyelonephritis episode in June 2019. Denies hematuria, chills, fever.\par LMP 7/6/20\par Also reports several episodes of blood per rectum. Last episode yesterday. Was straining to have BM.

## 2020-07-13 NOTE — REVIEW OF SYSTEMS
[Burning Sensation] : burning sensation during urination [Nocturia] : nocturia [Incomplete Emptying] : incomplete emptying of bladder [Negative] : Endocrine

## 2020-07-13 NOTE — ASSESSMENT
[FreeTextEntry1] : LUTS\par \par Will send U/A, C&S\par \par Microscopic hematuria\par \par Urine dip> small blood\par plan for renal US\par \par Hematochezia\par \par \par Discussed the possibility of interstitial cystitis\par \par Will refer to GI\par \par RTO 2 weeks\par

## 2020-07-13 NOTE — PHYSICAL EXAM
[General Appearance - Well Developed] : well developed [Normal Appearance] : normal appearance [General Appearance - Well Nourished] : well nourished [Well Groomed] : well groomed [General Appearance - In No Acute Distress] : no acute distress [] : no respiratory distress [Respiration, Rhythm And Depth] : normal respiratory rhythm and effort [Edema] : no peripheral edema [Exaggerated Use Of Accessory Muscles For Inspiration] : no accessory muscle use [Oriented To Time, Place, And Person] : oriented to person, place, and time [Not Anxious] : not anxious [Mood] : the mood was normal [Affect] : the affect was normal [Sensation] : the sensory exam was normal to light touch and pinprick [Normal Station and Gait] : the gait and station were normal for the patient's age [FreeTextEntry1] : minimal CVA tendwerness

## 2020-07-14 LAB
APPEARANCE: CLEAR
BACTERIA: NEGATIVE
BILIRUBIN URINE: NEGATIVE
BLOOD URINE: NEGATIVE
COLOR: NORMAL
GLUCOSE QUALITATIVE U: NEGATIVE
HYALINE CASTS: 2 /LPF
KETONES URINE: ABNORMAL
LEUKOCYTE ESTERASE URINE: NEGATIVE
MICROSCOPIC-UA: NORMAL
NITRITE URINE: NEGATIVE
PH URINE: 6
PROTEIN URINE: NEGATIVE
RED BLOOD CELLS URINE: 3 /HPF
SPECIFIC GRAVITY URINE: 1.01
SQUAMOUS EPITHELIAL CELLS: 4 /HPF
UROBILINOGEN URINE: NORMAL
WHITE BLOOD CELLS URINE: 4 /HPF

## 2020-07-16 ENCOUNTER — APPOINTMENT (OUTPATIENT)
Dept: ULTRASOUND IMAGING | Facility: CLINIC | Age: 25
End: 2020-07-16
Payer: MEDICAID

## 2020-07-16 ENCOUNTER — OUTPATIENT (OUTPATIENT)
Dept: OUTPATIENT SERVICES | Facility: HOSPITAL | Age: 25
LOS: 1 days | End: 2020-07-16
Payer: MEDICAID

## 2020-07-16 DIAGNOSIS — R31.29 OTHER MICROSCOPIC HEMATURIA: ICD-10-CM

## 2020-07-16 DIAGNOSIS — Z00.8 ENCOUNTER FOR OTHER GENERAL EXAMINATION: ICD-10-CM

## 2020-07-16 PROCEDURE — 76775 US EXAM ABDO BACK WALL LIM: CPT

## 2020-07-16 PROCEDURE — 76775 US EXAM ABDO BACK WALL LIM: CPT | Mod: 26

## 2020-07-17 DIAGNOSIS — Z87.440 PERSONAL HISTORY OF URINARY (TRACT) INFECTIONS: ICD-10-CM

## 2020-07-17 LAB — BACTERIA UR CULT: ABNORMAL

## 2020-07-17 RX ORDER — CEPHALEXIN 500 MG/1
500 CAPSULE ORAL
Qty: 14 | Refills: 0 | Status: DISCONTINUED | COMMUNITY
Start: 2020-07-17 | End: 2020-07-17

## 2020-07-30 ENCOUNTER — APPOINTMENT (OUTPATIENT)
Dept: UROLOGY | Facility: CLINIC | Age: 25
End: 2020-07-30
Payer: MEDICAID

## 2020-07-30 VITALS — WEIGHT: 120 LBS | HEIGHT: 59 IN | TEMPERATURE: 100.2 F | RESPIRATION RATE: 16 BRPM | BODY MASS INDEX: 24.19 KG/M2

## 2020-07-30 PROCEDURE — 99213 OFFICE O/P EST LOW 20 MIN: CPT

## 2020-07-30 RX ORDER — NITROFURANTOIN (MONOHYDRATE/MACROCRYSTALS) 25; 75 MG/1; MG/1
100 CAPSULE ORAL TWICE DAILY
Qty: 14 | Refills: 0 | Status: DISCONTINUED | COMMUNITY
Start: 2020-07-17 | End: 2020-07-30

## 2020-07-30 NOTE — PHYSICAL EXAM
[General Appearance - Well Developed] : well developed [Normal Appearance] : normal appearance [General Appearance - Well Nourished] : well nourished [Well Groomed] : well groomed [General Appearance - In No Acute Distress] : no acute distress [Abdomen Soft] : soft [Abdomen Tenderness] : non-tender [Costovertebral Angle Tenderness] : no ~M costovertebral angle tenderness [Respiration, Rhythm And Depth] : normal respiratory rhythm and effort [Edema] : no peripheral edema [] : no respiratory distress [Exaggerated Use Of Accessory Muscles For Inspiration] : no accessory muscle use [Oriented To Time, Place, And Person] : oriented to person, place, and time [Affect] : the affect was normal [Mood] : the mood was normal [Not Anxious] : not anxious [Normal Station and Gait] : the gait and station were normal for the patient's age

## 2020-07-30 NOTE — ASSESSMENT
[FreeTextEntry1] : Impression:\par resolved UTI\par \par Plan:\par \par follow up three months UTI\par she has started probiotics which I encouraged her to continue\par

## 2020-07-30 NOTE — HISTORY OF PRESENT ILLNESS
[FreeTextEntry1] : She presents in follow up of bladder infection. no fevers or chills.  She feels better.  no fevers or chills. no appreciable flank pain

## 2020-11-05 ENCOUNTER — APPOINTMENT (OUTPATIENT)
Dept: UROLOGY | Facility: CLINIC | Age: 25
End: 2020-11-05

## 2020-12-10 ENCOUNTER — OUTPATIENT (OUTPATIENT)
Dept: OUTPATIENT SERVICES | Age: 25
LOS: 1 days | Discharge: HOME | End: 2020-12-10

## 2020-12-11 ENCOUNTER — APPOINTMENT (OUTPATIENT)
Dept: PEDIATRIC CARDIOLOGY | Facility: CLINIC | Age: 25
End: 2020-12-11
Payer: MEDICAID

## 2020-12-11 PROCEDURE — 76821 MIDDLE CEREBRAL ARTERY ECHO: CPT

## 2020-12-11 PROCEDURE — 76825 ECHO EXAM OF FETAL HEART: CPT

## 2020-12-11 PROCEDURE — 76827 ECHO EXAM OF FETAL HEART: CPT

## 2020-12-11 PROCEDURE — 93325 DOPPLER ECHO COLOR FLOW MAPG: CPT | Mod: 59

## 2020-12-11 PROCEDURE — 99201 OFFICE OUTPATIENT NEW 10 MINUTES: CPT

## 2020-12-11 PROCEDURE — 76820 UMBILICAL ARTERY ECHO: CPT

## 2020-12-11 PROCEDURE — 99072 ADDL SUPL MATRL&STAF TM PHE: CPT

## 2020-12-23 PROBLEM — Z87.440 HISTORY OF URINARY TRACT INFECTION: Status: RESOLVED | Noted: 2020-07-17 | Resolved: 2020-12-23

## 2021-09-30 ENCOUNTER — APPOINTMENT (OUTPATIENT)
Dept: RHEUMATOLOGY | Facility: CLINIC | Age: 26
End: 2021-09-30

## 2021-10-21 ENCOUNTER — APPOINTMENT (OUTPATIENT)
Dept: RHEUMATOLOGY | Facility: CLINIC | Age: 26
End: 2021-10-21
Payer: MEDICAID

## 2021-10-21 VITALS
SYSTOLIC BLOOD PRESSURE: 116 MMHG | BODY MASS INDEX: 24.8 KG/M2 | WEIGHT: 123 LBS | HEART RATE: 75 BPM | HEIGHT: 59 IN | TEMPERATURE: 97 F | DIASTOLIC BLOOD PRESSURE: 73 MMHG | OXYGEN SATURATION: 100 %

## 2021-10-21 DIAGNOSIS — I73.00 RAYNAUD'S SYNDROME W/OUT GANGRENE: ICD-10-CM

## 2021-10-21 PROCEDURE — 99203 OFFICE O/P NEW LOW 30 MIN: CPT

## 2021-10-21 NOTE — PHYSICAL EXAM
[General Appearance - Alert] : alert [General Appearance - In No Acute Distress] : in no acute distress [General Appearance - Well Nourished] : well nourished [General Appearance - Well Developed] : well developed [Sclera] : the sclera and conjunctiva were normal [Outer Ear] : the ears and nose were normal in appearance [Neck Appearance] : the appearance of the neck was normal [] : no respiratory distress [Heart Sounds] : normal S1 and S2 [Involuntary Movements] : no involuntary movements were seen [No Focal Deficits] : no focal deficits [Oriented To Time, Place, And Person] : oriented to person, place, and time [FreeTextEntry1] : +pitting of the nails- hands and feet

## 2021-10-21 NOTE — HISTORY OF PRESENT ILLNESS
[FreeTextEntry1] : 26 year old female with PMH as listed below presents today for an initial evaluation\par \par Reports to have chronic hx of polyarthralgias. Symptoms worse to BL hands and BL feet. \par Reports intermittent swelling to the hands. Morning stiffness 1 hour + daily. Symptoms worse in AM,  better with activity. \par \par +psoriasis- on halobetasol. Currently stable. Has lesions to BL elbows, buttock. \par Currently taking Tylenol prn for pain\par \par +raynauds to BL hands and feet\par \par denies inflammatory back pain, enthesitis, uveitis, dactylitis\par  \par denies fever, chills, oral ulcers, chest pain, chest palpitations, denies sob, denies nausea, vomiting, abdominal pain, blood in the urine, denies blood clots\par \par FH: father with PsA

## 2021-10-21 NOTE — ASSESSMENT
[FreeTextEntry1] : Symptoms concerning for PsA, inflammatory arthritis\par \par - labs and imaging as below\par - NSAIDS/Tylenol prn for pain \par - OTC topical analgesics\par - derm f/u for psoriasis\par \par Discussed treatment plan with the patient. The patient was given the opportunity to ask questions and all questions were answered to their satisfaction.\par

## 2021-11-01 LAB
ALBUMIN SERPL ELPH-MCNC: 4.5 G/DL
ALP BLD-CCNC: 60 U/L
ALT SERPL-CCNC: 8 U/L
ANA SER IF-ACNC: NEGATIVE
ANION GAP SERPL CALC-SCNC: 13 MMOL/L
AST SERPL-CCNC: 16 U/L
BASOPHILS # BLD AUTO: 0.02 K/UL
BASOPHILS NFR BLD AUTO: 0.6 %
BILIRUB SERPL-MCNC: 0.3 MG/DL
BUN SERPL-MCNC: 8 MG/DL
CALCIUM SERPL-MCNC: 9.1 MG/DL
CCP AB SER IA-ACNC: <8 UNITS
CHLORIDE SERPL-SCNC: 105 MMOL/L
CK SERPL-CCNC: 83 U/L
CO2 SERPL-SCNC: 20 MMOL/L
CREAT SERPL-MCNC: 0.54 MG/DL
CREAT SPEC-SCNC: 26 MG/DL
CREAT/PROT UR: 0.5 RATIO
CRP SERPL-MCNC: <3 MG/L
EOSINOPHIL # BLD AUTO: 0.06 K/UL
EOSINOPHIL NFR BLD AUTO: 1.7 %
ERYTHROCYTE [SEDIMENTATION RATE] IN BLOOD BY WESTERGREN METHOD: 17 MM/HR
GLUCOSE SERPL-MCNC: 88 MG/DL
HAV IGM SER QL: NONREACTIVE
HBV CORE IGG+IGM SER QL: NONREACTIVE
HBV CORE IGM SER QL: NONREACTIVE
HBV E AB SER QL: NONREACTIVE
HBV E AG SER QL: NONREACTIVE
HBV SURFACE AB SER QL: NONREACTIVE
HBV SURFACE AG SER QL: NONREACTIVE
HCT VFR BLD CALC: 38.4 %
HCV AB SER QL: NONREACTIVE
HCV S/CO RATIO: 0.18 S/CO
HEPATITIS A IGG ANTIBODY: NONREACTIVE
HEPATITIS A IGG ANTIBODY: NONREACTIVE
HGB BLD-MCNC: 11.4 G/DL
HLA-B27 RELATED AG QL: NEGATIVE
IMM GRANULOCYTES NFR BLD AUTO: 0 %
LYMPHOCYTES # BLD AUTO: 1.38 K/UL
LYMPHOCYTES NFR BLD AUTO: 38.2 %
M TB IFN-G BLD-IMP: NEGATIVE
MAN DIFF?: NORMAL
MCHC RBC-ENTMCNC: 27.9 PG
MCHC RBC-ENTMCNC: 29.7 GM/DL
MCV RBC AUTO: 94.1 FL
MONOCYTES # BLD AUTO: 0.32 K/UL
MONOCYTES NFR BLD AUTO: 8.9 %
NEUTROPHILS # BLD AUTO: 1.83 K/UL
NEUTROPHILS NFR BLD AUTO: 50.6 %
PLATELET # BLD AUTO: 361 K/UL
POTASSIUM SERPL-SCNC: 4.1 MMOL/L
PROT SERPL-MCNC: 7.1 G/DL
PROT UR-MCNC: 12 MG/DL
QUANTIFERON TB PLUS MITOGEN MINUS NIL: >10 IU/ML
QUANTIFERON TB PLUS NIL: 0.03 IU/ML
QUANTIFERON TB PLUS TB1 MINUS NIL: -0.01 IU/ML
QUANTIFERON TB PLUS TB2 MINUS NIL: -0.01 IU/ML
RBC # BLD: 4.08 M/UL
RBC # FLD: 14.5 %
RF+CCP IGG SER-IMP: NEGATIVE
RHEUMATOID FACT SER QL: <10 IU/ML
SODIUM SERPL-SCNC: 139 MMOL/L
TSH SERPL-ACNC: 1.06 UIU/ML
WBC # FLD AUTO: 3.61 K/UL

## 2021-11-16 ENCOUNTER — APPOINTMENT (OUTPATIENT)
Dept: NEUROLOGY | Facility: CLINIC | Age: 26
End: 2021-11-16

## 2021-11-18 ENCOUNTER — APPOINTMENT (OUTPATIENT)
Dept: RHEUMATOLOGY | Facility: CLINIC | Age: 26
End: 2021-11-18
Payer: MEDICAID

## 2021-11-18 VITALS
SYSTOLIC BLOOD PRESSURE: 109 MMHG | RESPIRATION RATE: 16 BRPM | HEART RATE: 84 BPM | OXYGEN SATURATION: 100 % | TEMPERATURE: 98.1 F | HEIGHT: 59 IN | WEIGHT: 121 LBS | BODY MASS INDEX: 24.39 KG/M2 | DIASTOLIC BLOOD PRESSURE: 74 MMHG

## 2021-11-18 DIAGNOSIS — M25.60 STIFFNESS OF UNSPECIFIED JOINT, NOT ELSEWHERE CLASSIFIED: ICD-10-CM

## 2021-11-18 DIAGNOSIS — L40.9 PSORIASIS, UNSPECIFIED: ICD-10-CM

## 2021-11-18 DIAGNOSIS — M25.50 PAIN IN UNSPECIFIED JOINT: ICD-10-CM

## 2021-11-18 DIAGNOSIS — L40.50 ARTHROPATHIC PSORIASIS, UNSPECIFIED: ICD-10-CM

## 2021-11-18 PROCEDURE — 99214 OFFICE O/P EST MOD 30 MIN: CPT

## 2021-11-18 NOTE — HISTORY OF PRESENT ILLNESS
[FreeTextEntry1] : Pt presenting today for a f.u visit. \par labs from 10/2021 reviewed with pt. \par Continues to have polyarthralgias, intermittent swelling to the hands. Morning stiffness 1 hour + daily. +psoriasis- on halobetasol. \par Currently taking Tylenol prn for pain.

## 2021-11-18 NOTE — ASSESSMENT
[FreeTextEntry1] : Symptoms concerning for PsA, inflammatory arthritis\par +psoriasis\par \par - Hepatitis profile/ Quantiferon: negative\par - OTC topical analgesics\par - derm f/u for psoriasis\par - start Humira 40 mg SC q 2 weeks (reviewed risk and benefits of medications)\par \par Discussed treatment plan with the patient. The patient was given the opportunity to ask questions and all questions were answered to their satisfaction.\par

## 2021-11-19 RX ORDER — ADALIMUMAB 40MG/0.4ML
40 KIT SUBCUTANEOUS
Qty: 2 | Refills: 2 | Status: ACTIVE | COMMUNITY
Start: 2021-11-18

## 2021-12-17 NOTE — ED STATDOCS - ENMT, MLM
Mouth with normal mucosa  Throat has no vesicles, no oropharyngeal exudates and uvula is midline.
no

## 2022-01-20 ENCOUNTER — APPOINTMENT (OUTPATIENT)
Dept: RHEUMATOLOGY | Facility: CLINIC | Age: 27
End: 2022-01-20

## 2022-08-22 ENCOUNTER — APPOINTMENT (OUTPATIENT)
Dept: CARDIOLOGY | Facility: CLINIC | Age: 27
End: 2022-08-22

## 2022-08-22 ENCOUNTER — NON-APPOINTMENT (OUTPATIENT)
Age: 27
End: 2022-08-22

## 2022-08-22 VITALS
DIASTOLIC BLOOD PRESSURE: 77 MMHG | WEIGHT: 115.31 LBS | SYSTOLIC BLOOD PRESSURE: 115 MMHG | BODY MASS INDEX: 23.24 KG/M2 | HEIGHT: 59 IN | HEART RATE: 83 BPM | OXYGEN SATURATION: 98 % | TEMPERATURE: 98.9 F

## 2022-08-22 DIAGNOSIS — Z87.898 PERSONAL HISTORY OF OTHER SPECIFIED CONDITIONS: ICD-10-CM

## 2022-08-22 DIAGNOSIS — R07.9 CHEST PAIN, UNSPECIFIED: ICD-10-CM

## 2022-08-22 PROCEDURE — 93000 ELECTROCARDIOGRAM COMPLETE: CPT

## 2022-08-22 PROCEDURE — 99204 OFFICE O/P NEW MOD 45 MIN: CPT | Mod: 25

## 2022-08-22 NOTE — DISCUSSION/SUMMARY
[FreeTextEntry1] : 27F with no pmhx presents to establish CV care\par \par 1. CP\par -atypical\par -not on exertion, intermittent episodes, can wake her up from sleep\par -pt with psoriatic arthritis, untreated, could be exacerbation\par -would check tte to r/o structural heart dz, eval pericardium\par -will check extended holter to catch some of these episodes\par \par f/u after testing\par \par \par \par >45 min spent on complete encounter.  [EKG obtained to assist in diagnosis and management of assessed problem(s)] : EKG obtained to assist in diagnosis and management of assessed problem(s)

## 2022-08-22 NOTE — REVIEW OF SYSTEMS
[Headache] : headache [Chest Discomfort] : chest discomfort [Easy Bruising] : a tendency for easy bruising [Fever] : no fever [Weight Gain (___ Lbs)] : no recent weight gain [Chills] : no chills [Feeling Fatigued] : not feeling fatigued [Weight Loss (___ Lbs)] : no recent weight loss [Blurry Vision] : no blurred vision [Sore Throat] : no sore throat [SOB] : no shortness of breath [Dyspnea on exertion] : not dyspnea during exertion [Lower Ext Edema] : no extremity edema [Palpitations] : no palpitations [Orthopnea] : no orthopnea [Syncope] : no syncope [Cough] : no cough [Wheezing] : no wheezing [Nausea] : no nausea [Vomiting] : no vomiting [Dizziness] : no dizziness [Confusion] : no confusion was observed [Easy Bleeding] : no tendency for easy bleeding

## 2022-08-22 NOTE — HISTORY OF PRESENT ILLNESS
[FreeTextEntry1] : 27F presents to establish care\par Sent in by: found online. \par PMD: Dr Marlo Baker [place]\par \par \par pt endorsing L sided cp over the past week. also on the right side at times, and can radiate to the back. 10/10. pain comes and goes. pt can not think of any exacerbating or alleviating factors. pt went to the ER 2/2 these symptoms at [facility], sent home from ER, was told everything was fine. \par pain is sharp, can last for up to an hour. \par denies assoc sob, but does endorse palpitations. pt states she has gotten woken up at night with these symptoms. \par pt states she had some of these symptoms a few yeara ago, but the resolved on their own. \par pt with hx of psoriatic arthritis, but not on meds. \par denies syncope. \par \par denies CP, SOB on exertion. Denies LE edema, orthopnea\par \par \par Prev cardiac history:\par \par Exercise: none\par Diet: none\par \par Previous cardiac testing: none\par Recent labs:\par \par \par EKG: SR 89\par \par \par Med hx: asthma, psoriatic arthirits\par OBGYN hx: one child. No Hx of gestational DM, gestational HTN, preeclampsia.   + Hx of miscarriage. regular menstrual cycles\par Sx hx: none\par Family hx: no known cardiac hx\par Social hx:  lives in [place] with boyfriend. drives a school bus, and also works at [employer]  no tob/etoh/drugs\par Meds: excedrin prn migraines. \par Allergies: nkda \par

## 2022-08-22 NOTE — PHYSICAL EXAM
[Well Developed] : well developed [Well Nourished] : well nourished [No Acute Distress] : no acute distress [Normal S1, S2] : normal S1, S2 [No Murmur] : no murmur [Clear Lung Fields] : clear lung fields [Good Air Entry] : good air entry [No Respiratory Distress] : no respiratory distress  [Soft] : abdomen soft [Non Tender] : non-tender [No Edema] : no edema [Moves all extremities] : moves all extremities [No Focal Deficits] : no focal deficits [Alert and Oriented] : alert and oriented

## 2022-08-29 ENCOUNTER — APPOINTMENT (OUTPATIENT)
Dept: CARDIOLOGY | Facility: CLINIC | Age: 27
End: 2022-08-29

## 2022-08-29 PROCEDURE — 93306 TTE W/DOPPLER COMPLETE: CPT

## 2022-11-10 ENCOUNTER — APPOINTMENT (OUTPATIENT)
Dept: ENDOCRINOLOGY | Facility: CLINIC | Age: 27
End: 2022-11-10

## 2024-03-26 NOTE — ED ADULT NURSE NOTE - OBJECTIVE STATEMENT
Daily Note     Today's date: 3/26/2024  Patient name: Liana Dsouza  : 1951  MRN: [id number]  Referring provider: Brent Alvarado*  Dx:   Encounter Diagnosis     ICD-10-CM    1. Lumbar radiculopathy  M54.16                      Subjective: Continued improvement       Objective: See treatment diary below      Assessment: Tolerated treatment well. Patient demonstrated fatigue post treatment and would benefit from continued PT      Plan: Continue per plan of care.  Progress treatment as tolerated.       Precautions: N/A    Manuals  2/20 2/27 3/5 3/12 3/19 3/26      PA mobs NORRIS NORRIS NORRIS NORRIS NORRIS NORRIS       Lumbar Roll R closing    NORRIS NORRIS                                  Neuro Re-Ed                                                                                                                     Ther Ex             Treadmill   5' 10' 10' 10' 10'      Prone lying hips to L  2x10' 3x10' 2x10' saggital w OP 2x5'  2x5' saggital w OP 2x5' saggital w OP      PPU hips to   10' 10x saggital 20x standing  2x10 saggital 2x10 saggital      Sideglide with pillow HEP   20x 2 pillows 2x10 2 pillows 3x10       Pallof press  7# 3x10 ea 6# 20x ea 7# 20x ea 7# 20x ea 8# 20x ea 8# 20x ea      Press Downs   16# 30x 18# 30x 18# 30x 20# 30x 20# 30x                   Hip ABd    30# 3x10 30# 3x10 35# 4x10 35# 4x10      Leg Press    60# 3x10 60# 3x10 70# 4x10 80# 4x10      HS curl     15# 3x10 25# 4x10 35# 4x10      Sled Push      25# 4x50ft 25# 4x50ft                   Ther Activity                                       Gait Training                                       Modalities                                                      
assumed pt care at 1144. Pt reports UTI with pain in left flank worsening over the past 3 days.  Vomiting started yesterday.